# Patient Record
Sex: MALE | Race: WHITE | NOT HISPANIC OR LATINO | Employment: FULL TIME | URBAN - METROPOLITAN AREA
[De-identification: names, ages, dates, MRNs, and addresses within clinical notes are randomized per-mention and may not be internally consistent; named-entity substitution may affect disease eponyms.]

---

## 2020-11-19 ENCOUNTER — OFFICE VISIT (OUTPATIENT)
Dept: FAMILY MEDICINE CLINIC | Facility: CLINIC | Age: 30
End: 2020-11-19

## 2020-11-19 VITALS
HEIGHT: 73 IN | OXYGEN SATURATION: 98 % | TEMPERATURE: 97.3 F | SYSTOLIC BLOOD PRESSURE: 130 MMHG | BODY MASS INDEX: 22.32 KG/M2 | HEART RATE: 86 BPM | WEIGHT: 168.4 LBS | DIASTOLIC BLOOD PRESSURE: 70 MMHG

## 2020-11-19 DIAGNOSIS — B00.9 HERPES INFECTION: Primary | ICD-10-CM

## 2020-11-19 PROCEDURE — 90471 IMMUNIZATION ADMIN: CPT | Performed by: INTERNAL MEDICINE

## 2020-11-19 PROCEDURE — 99214 OFFICE O/P EST MOD 30 MIN: CPT | Performed by: INTERNAL MEDICINE

## 2020-11-19 PROCEDURE — 90686 IIV4 VACC NO PRSV 0.5 ML IM: CPT | Performed by: INTERNAL MEDICINE

## 2020-11-20 LAB
HSV1 IGG SER IA-ACNC: 1.75 INDEX (ref 0–0.9)
HSV1+2 IGM SER IA-ACNC: <0.91 RATIO (ref 0–0.9)
HSV2 IGG SER IA-ACNC: <0.91 INDEX (ref 0–0.9)

## 2020-12-11 ENCOUNTER — TELEPHONE (OUTPATIENT)
Dept: FAMILY MEDICINE CLINIC | Facility: CLINIC | Age: 30
End: 2020-12-11

## 2020-12-12 NOTE — TELEPHONE ENCOUNTER
I discussed wakeup I discussed the patient's lab tests for herpes and we'll try to repeat it in February or March.

## 2021-06-03 ENCOUNTER — OFFICE VISIT (OUTPATIENT)
Dept: FAMILY MEDICINE CLINIC | Facility: CLINIC | Age: 31
End: 2021-06-03

## 2021-06-03 VITALS
BODY MASS INDEX: 21.71 KG/M2 | DIASTOLIC BLOOD PRESSURE: 64 MMHG | HEIGHT: 73 IN | HEART RATE: 87 BPM | OXYGEN SATURATION: 97 % | WEIGHT: 163.8 LBS | TEMPERATURE: 97.5 F | SYSTOLIC BLOOD PRESSURE: 118 MMHG

## 2021-06-03 DIAGNOSIS — E78.1 PURE HYPERTRIGLYCERIDEMIA: ICD-10-CM

## 2021-06-03 DIAGNOSIS — B00.9 HERPES INFECTION: ICD-10-CM

## 2021-06-03 DIAGNOSIS — Z00.00 PE (PHYSICAL EXAM), ANNUAL: Primary | ICD-10-CM

## 2021-06-03 PROCEDURE — 99395 PREV VISIT EST AGE 18-39: CPT | Performed by: INTERNAL MEDICINE

## 2021-06-03 NOTE — PROGRESS NOTES
06/03/2021    CC: Annual Exam  .        HPI  This pleasant 30-year-old presents at this time for physical examination patient relates he is feeling fine has had no problems in the interim of visits.  He is working 40 hours a week.       Edis Bolanos is a 30 y.o. male.      The following portions of the patient's history were reviewed and updated as appropriate: allergies, current medications, past family history, past medical history, past social history, past surgical history and problem list.    Problem List  There is no problem list on file for this patient.      Past Medical History  History reviewed. No pertinent past medical history.    Surgical History  Past Surgical History:   Procedure Laterality Date   • PLEURAL SCARIFICATION         Family History  History reviewed. No pertinent family history.    Social History  Social History    Tobacco Use      Smoking status: Former Smoker      Smokeless tobacco: Never Used       Is the Patient a current tobacco user? No    Allergies  No Known Allergies    Current Medications  No current outpatient medications on file.     Review of System  Review of Systems   Constitutional: Negative.    HENT: Negative.    Eyes: Negative.    Respiratory: Negative.    Cardiovascular: Negative.    Gastrointestinal: Negative.    Musculoskeletal: Negative.    Skin: Negative.    Psychiatric/Behavioral: Negative.      I have reviewed and confirmed the accuracy of the ROS as documented by the MA/LPN/RN Elvis Solo MD    Vitals:    06/03/21 1000   BP: 118/64   Pulse: 87   Temp: 97.5 °F (36.4 °C)   SpO2: 97%     Body mass index is 21.91 kg/m².    Objective     Physical Exam  Physical Exam  Vitals and nursing note reviewed.   Constitutional:       Appearance: He is well-developed.   HENT:      Head: Normocephalic and atraumatic.   Eyes:      Conjunctiva/sclera: Conjunctivae normal.   Cardiovascular:      Rate and Rhythm: Normal rate and regular rhythm.      Heart sounds:  Normal heart sounds.   Pulmonary:      Effort: Pulmonary effort is normal.      Breath sounds: Normal breath sounds.   Abdominal:      General: Bowel sounds are normal.      Palpations: Abdomen is soft.   Musculoskeletal:         General: Normal range of motion.      Cervical back: Normal range of motion and neck supple.   Skin:     General: Skin is warm and dry.   Neurological:      Mental Status: He is alert and oriented to person, place, and time.   Psychiatric:         Behavior: Behavior normal.         Assessment/Plan    Regarding anticipatory guidance.  We discussed the importance of regular physical exercise.  He relates that he is not been very regular considering the pandemic but he will endeavor to.    We will see the patient back for follow-up in about 6 months.  He is on no medication at this point.      Diagnoses and all orders for this visit:    1. PE (physical exam), annual (Primary)  -     Comprehensive Metabolic Panel  -     Urinalysis With Culture If Indicated -  -     CBC & Differential  -     Hepatitis C Antibody    2. Pure hypertriglyceridemia  -     Lipid Panel With / Chol / HDL Ratio    3. Herpes infection  -     HSV 1 & 2 - Specific Antibody, IgG             Elvis Solo MD  06/03/2021

## 2021-06-04 LAB
ALBUMIN SERPL-MCNC: 4.6 G/DL (ref 4.1–5.2)
ALBUMIN/GLOB SERPL: 1.7 {RATIO} (ref 1.2–2.2)
ALP SERPL-CCNC: 45 IU/L (ref 48–121)
ALT SERPL-CCNC: 27 IU/L (ref 0–44)
AST SERPL-CCNC: 30 IU/L (ref 0–40)
BASOPHILS # BLD AUTO: 0 X10E3/UL (ref 0–0.2)
BASOPHILS NFR BLD AUTO: 1 %
BILIRUB SERPL-MCNC: 0.5 MG/DL (ref 0–1.2)
BUN SERPL-MCNC: 13 MG/DL (ref 6–20)
BUN/CREAT SERPL: 17 (ref 9–20)
CALCIUM SERPL-MCNC: 9.2 MG/DL (ref 8.7–10.2)
CHLORIDE SERPL-SCNC: 103 MMOL/L (ref 96–106)
CHOLEST SERPL-MCNC: 149 MG/DL (ref 100–199)
CHOLEST/HDLC SERPL: 3.9 RATIO (ref 0–5)
CO2 SERPL-SCNC: 26 MMOL/L (ref 20–29)
CREAT SERPL-MCNC: 0.77 MG/DL (ref 0.76–1.27)
EOSINOPHIL # BLD AUTO: 0.1 X10E3/UL (ref 0–0.4)
EOSINOPHIL NFR BLD AUTO: 1 %
ERYTHROCYTE [DISTWIDTH] IN BLOOD BY AUTOMATED COUNT: 12.1 % (ref 11.6–15.4)
GLOBULIN SER CALC-MCNC: 2.7 G/DL (ref 1.5–4.5)
GLUCOSE SERPL-MCNC: 88 MG/DL (ref 65–99)
HCT VFR BLD AUTO: 43.6 % (ref 37.5–51)
HCV AB S/CO SERPL IA: <0.1 S/CO RATIO (ref 0–0.9)
HDLC SERPL-MCNC: 38 MG/DL
HGB BLD-MCNC: 15.1 G/DL (ref 13–17.7)
HSV1 IGG SER IA-ACNC: 1.46 INDEX (ref 0–0.9)
HSV2 IGG SER IA-ACNC: <0.91 INDEX (ref 0–0.9)
IMM GRANULOCYTES # BLD AUTO: 0 X10E3/UL (ref 0–0.1)
IMM GRANULOCYTES NFR BLD AUTO: 0 %
LDLC SERPL CALC-MCNC: 98 MG/DL (ref 0–99)
LYMPHOCYTES # BLD AUTO: 1.4 X10E3/UL (ref 0.7–3.1)
LYMPHOCYTES NFR BLD AUTO: 19 %
MCH RBC QN AUTO: 30.7 PG (ref 26.6–33)
MCHC RBC AUTO-ENTMCNC: 34.6 G/DL (ref 31.5–35.7)
MCV RBC AUTO: 89 FL (ref 79–97)
MONOCYTES # BLD AUTO: 0.5 X10E3/UL (ref 0.1–0.9)
MONOCYTES NFR BLD AUTO: 7 %
NEUTROPHILS # BLD AUTO: 5.2 X10E3/UL (ref 1.4–7)
NEUTROPHILS NFR BLD AUTO: 72 %
PLATELET # BLD AUTO: 250 X10E3/UL (ref 150–450)
POTASSIUM SERPL-SCNC: 4.2 MMOL/L (ref 3.5–5.2)
PROT SERPL-MCNC: 7.3 G/DL (ref 6–8.5)
RBC # BLD AUTO: 4.92 X10E6/UL (ref 4.14–5.8)
SODIUM SERPL-SCNC: 140 MMOL/L (ref 134–144)
TRIGL SERPL-MCNC: 66 MG/DL (ref 0–149)
VLDLC SERPL CALC-MCNC: 13 MG/DL (ref 5–40)
WBC # BLD AUTO: 7.2 X10E3/UL (ref 3.4–10.8)

## 2021-06-16 ENCOUNTER — OFFICE VISIT (OUTPATIENT)
Dept: FAMILY MEDICINE CLINIC | Facility: CLINIC | Age: 31
End: 2021-06-16

## 2021-06-16 VITALS
HEIGHT: 73 IN | RESPIRATION RATE: 16 BRPM | BODY MASS INDEX: 21.6 KG/M2 | WEIGHT: 163 LBS | SYSTOLIC BLOOD PRESSURE: 110 MMHG | DIASTOLIC BLOOD PRESSURE: 70 MMHG

## 2021-06-16 DIAGNOSIS — B00.9 HERPES SIMPLEX INFECTION: Primary | ICD-10-CM

## 2021-06-16 PROCEDURE — 99213 OFFICE O/P EST LOW 20 MIN: CPT | Performed by: INTERNAL MEDICINE

## 2021-06-20 NOTE — PROGRESS NOTES
06/16/2021    CC: Follow-up (on labs)  .        HPI  History of Present Illness     Subjective   Gomez Bolanos is a 30 y.o. male.      The following portions of the patient's history were reviewed and updated as appropriate: allergies, current medications, past family history, past medical history, past social history, past surgical history and problem list.    Problem List  There is no problem list on file for this patient.      Past Medical History  History reviewed. No pertinent past medical history.    Surgical History  Past Surgical History:   Procedure Laterality Date   • PLEURAL SCARIFICATION         Family History  History reviewed. No pertinent family history.    Social History  Social History    Tobacco Use      Smoking status: Former Smoker      Smokeless tobacco: Never Used       Is the Patient a current tobacco user? No    Allergies  No Known Allergies    Current Medications  No current outpatient medications on file.     Review of System  Review of Systems   Constitutional: Negative.    HENT: Negative.    Eyes: Negative.    Respiratory: Negative.    Cardiovascular: Negative.    Gastrointestinal: Negative.    Endocrine: Negative.    Genitourinary: Negative.      I have reviewed and confirmed the accuracy of the ROS as documented by the MA/LPN/RN Elvis Solo MD    Vitals:    06/16/21 1103   BP: 110/70   Resp: 16     Body mass index is 21.8 kg/m².    Objective     Physical Exam  Physical Exam  Cardiovascular:      Rate and Rhythm: Normal rate and regular rhythm.      Pulses: Normal pulses.      Heart sounds: Normal heart sounds.   Pulmonary:      Effort: Pulmonary effort is normal.      Breath sounds: Normal breath sounds.         Assessment/Plan          This pleasant 30-year-old returns at this time for discussion about laboratory test results.  He is very concerned earlier in the year regarding HSV testing.  Although he had not had a kolby genital lesion is very concerned about possibility of  infection.    His testing on 6/3 showed HSV 1 IgG of 1.461 while 7 months ago was 1.75.    His HSV 2 IgG was less than 0.91 on 66/3/21 and 7 months ago indicating no antibodies against HSV 2 we discussed these details with him and specifics indicating there is no indication of HSV 2 infection.    We answered his questions.  We'll see the patient in follow-up in 6-12 months.      Diagnoses and all orders for this visit:    1. Herpes simplex infection (Primary)             Elvis Solo MD  06/16/2021

## 2023-04-12 ENCOUNTER — OFFICE VISIT (OUTPATIENT)
Dept: FAMILY MEDICINE CLINIC | Facility: CLINIC | Age: 33
End: 2023-04-12
Payer: COMMERCIAL

## 2023-04-12 VITALS
HEART RATE: 90 BPM | SYSTOLIC BLOOD PRESSURE: 100 MMHG | BODY MASS INDEX: 21.84 KG/M2 | WEIGHT: 164.8 LBS | OXYGEN SATURATION: 100 % | HEIGHT: 73 IN | DIASTOLIC BLOOD PRESSURE: 50 MMHG

## 2023-04-12 DIAGNOSIS — L02.811 CELLULITIS AND ABSCESS OF HEAD: ICD-10-CM

## 2023-04-12 DIAGNOSIS — F41.9 ANXIETY DISORDER, UNSPECIFIED TYPE: Primary | ICD-10-CM

## 2023-04-12 DIAGNOSIS — L03.811 CELLULITIS AND ABSCESS OF HEAD: ICD-10-CM

## 2023-04-12 DIAGNOSIS — R59.9 LYMPH NODE ENLARGEMENT: ICD-10-CM

## 2023-04-12 PROCEDURE — 99214 OFFICE O/P EST MOD 30 MIN: CPT | Performed by: INTERNAL MEDICINE

## 2023-04-12 RX ORDER — ESCITALOPRAM OXALATE 10 MG/1
10 TABLET ORAL DAILY
Qty: 30 TABLET | Refills: 2 | Status: SHIPPED | OUTPATIENT
Start: 2023-04-12

## 2023-04-12 RX ORDER — DIPHENOXYLATE HYDROCHLORIDE AND ATROPINE SULFATE 2.5; .025 MG/1; MG/1
TABLET ORAL
COMMUNITY

## 2023-04-12 RX ORDER — CEPHALEXIN 500 MG/1
500 CAPSULE ORAL 2 TIMES DAILY
Qty: 20 CAPSULE | Refills: 0 | Status: SHIPPED | OUTPATIENT
Start: 2023-04-12

## 2023-04-12 NOTE — PROGRESS NOTES
Answers for HPI/ROS submitted by the patient on 4/11/2023  What is the primary reason for your visit?: Other  Please describe your symptoms.: Sleep disorder and lump on right side of neck  Have you had these symptoms before?: Yes  How long have you been having these symptoms?: Greater than 2 weeks  Please list any medications you are currently taking for this condition.: None  Please describe any probable cause for these symptoms. : Trauma induced sleep disorder. Unsure of lump on neck - possible from past absess infection    04/12/2023    Assessment & Plan      This 32-year-old presents at this time for evaluation of a mass on the right side of his neck is been present for about a month or so.  He relates it is not painful has not changed in size but he is becoming increasingly concerned about it.  He denies any chills or fever.  He also has a inflammation about 3 cm superiorly toward the pinna on the right ear of a area of cellulitis about 3 cm in diameter.  He relates that the area of cellulitis is only been present for the past several days.    He also complains of problems sleeping.  Note is made that he was previously diagnosed with general anxiety disorder poor.  He resisted medication at the time but that he is more amenable to it now.  He relates that he awakens 10 to 15-20 times during the night and has difficulty getting back to sleep.  He does not snore he relates.  He relates some of the sleep back to some occurrences of events as a child.  Diagnoses and all orders for this visit:    1. Anxiety disorder, unspecified type (Primary)    2. Cellulitis and abscess of head    3. Lymph node enlargement  -     Ambulatory Referral to ENT (Otolaryngology)    Other orders  -     cephalexin (Keflex) 500 MG capsule; Take 1 capsule by mouth 2 (Two) Times a Day.  Dispense: 20 capsule; Refill: 0  -     escitalopram (Lexapro) 10 MG tablet; Take 1 tablet by mouth Daily.  Dispense: 30 tablet; Refill: 2             CC:  Mass (On right side of neck.  Noticed 1 month ago.  No pain.  Has not changed in size.) and Insomnia (Has trouble staying to sleep.  Ongoing since childhood.  Will wake up 10-20 times every night.----no other issues)  .        HPI  History of Present Illness     Subjective   Gomez Bolanos is a 32 y.o. male.      The following portions of the patient's history were reviewed and updated as appropriate: allergies, current medications, past family history, past medical history, past social history, past surgical history and problem list.    Problem List  There is no problem list on file for this patient.      Past Medical History  History reviewed. No pertinent past medical history.    Surgical History  Past Surgical History:   Procedure Laterality Date   • PLEURAL SCARIFICATION         Family History  History reviewed. No pertinent family history.    Social History  Social History    Tobacco Use      Smoking status: Former        Packs/day: 0.00        Years: 0.50        Pack years: 0        Types: Cigarettes        Start date: 2010        Quit date: 2010        Years since quittin.2      Smokeless tobacco: Never      Tobacco comments: Very little consumed in        Is the Patient a current tobacco user? No    Allergies  No Known Allergies    Current Medications    Current Outpatient Medications:   •  multivitamin (THERAGRAN) tablet tablet, Take  by mouth., Disp: , Rfl:   •  cephalexin (Keflex) 500 MG capsule, Take 1 capsule by mouth 2 (Two) Times a Day., Disp: 20 capsule, Rfl: 0  •  escitalopram (Lexapro) 10 MG tablet, Take 1 tablet by mouth Daily., Disp: 30 tablet, Rfl: 2     Review of System  Review of Systems   Constitutional: Negative for chills and fever.   Respiratory: Negative for cough and shortness of breath.    Cardiovascular: Negative for chest pain and palpitations.   Gastrointestinal: Negative for constipation, diarrhea, nausea and vomiting.   Neurological: Negative for dizziness and  headache.     I have reviewed and confirmed the accuracy of the ROS as documented by the MA/LPN/RN Elvis Solo MD    Vitals:    04/12/23 1055   BP: 100/50   Pulse: 90   SpO2: 100%     Body mass index is 22.04 kg/m².    Objective     Physical Exam  Physical Exam  Constitutional:       General: He is not in acute distress.     Appearance: Normal appearance.   HENT:      Head: Normocephalic and atraumatic.   Cardiovascular:      Rate and Rhythm: Normal rate and regular rhythm.   Pulmonary:      Effort: Pulmonary effort is normal. No respiratory distress.      Breath sounds: Normal breath sounds. No wheezing, rhonchi or rales.   Skin:     General: Skin is warm.      Findings: Lesion present.      Comments: 2 cm rubbery lesion consistent with lymph node in the right an cervical lymph chain.   Neurological:      General: No focal deficit present.      Mental Status: He is alert and oriented to person, place, and time.   Psychiatric:         Mood and Affect: Mood normal.         Behavior: Behavior normal.         Thought Content: Thought content normal.         Judgment: Judgment normal.             Elvis Solo MD  04/12/2023

## 2023-08-15 ENCOUNTER — TELEPHONE (OUTPATIENT)
Dept: FAMILY MEDICINE CLINIC | Facility: CLINIC | Age: 33
End: 2023-08-15

## 2023-08-15 NOTE — TELEPHONE ENCOUNTER
Caller: Gomez Bolanos    Relationship to patient: Self    Best call back number:     Patient is needing: PATIENT IS REQUESTING A CALLBACK FROM DR. COELHO AS HE HAS A QUESTION ABOUT THE COLOR OF HIS FECAL MATTER.

## 2024-07-22 ENCOUNTER — OFFICE VISIT (OUTPATIENT)
Dept: FAMILY MEDICINE CLINIC | Facility: CLINIC | Age: 34
End: 2024-07-22
Payer: COMMERCIAL

## 2024-07-22 VITALS
HEIGHT: 73 IN | SYSTOLIC BLOOD PRESSURE: 90 MMHG | BODY MASS INDEX: 22.93 KG/M2 | WEIGHT: 173 LBS | DIASTOLIC BLOOD PRESSURE: 70 MMHG

## 2024-07-22 DIAGNOSIS — E78.5 HYPERLIPIDEMIA, UNSPECIFIED HYPERLIPIDEMIA TYPE: ICD-10-CM

## 2024-07-22 DIAGNOSIS — D50.9 IRON DEFICIENCY ANEMIA, UNSPECIFIED IRON DEFICIENCY ANEMIA TYPE: ICD-10-CM

## 2024-07-22 DIAGNOSIS — I10 PRIMARY HYPERTENSION: Primary | ICD-10-CM

## 2024-07-22 PROCEDURE — 99395 PREV VISIT EST AGE 18-39: CPT | Performed by: INTERNAL MEDICINE

## 2024-08-05 NOTE — PROGRESS NOTES
2024    Assessment & Plan   This pleasant 34-year-old presents at this time for physical examination.  He relates that he has had low back pain off and on for the past 2 to 3 weeks.  It does not inhibit him from his usual activities or work.      Regarding anticipatory guidance.  We discussed the importance of keeping his LDL cholesterol less than 100.  His last cholesterol reviewed back on 3/19 showed an LDL of 98.    We gave him a low-cholesterol diet sheet for implementation at our direction.    Diagnoses and all orders for this visit:    1. Primary hypertension (Primary)  -     Comprehensive Metabolic Panel    2. Hyperlipidemia, unspecified hyperlipidemia type  -     Lipid Panel With / Chol / HDL Ratio    3. Iron deficiency anemia, unspecified iron deficiency anemia type  -     CBC & Differential      BMI is within normal parameters. No other follow-up for BMI required.           CC: Annual Exam  .        HPI  History of Present Illness     Subjective   Gomez Bolanos is a 34 y.o. male.      The following portions of the patient's history were reviewed and updated as appropriate: allergies, current medications, past family history, past medical history, past social history, past surgical history, and problem list.    Problem List  There is no problem list on file for this patient.      Past Medical History  History reviewed. No pertinent past medical history.    Surgical History  Past Surgical History:   Procedure Laterality Date    PLEURAL SCARIFICATION         Family History  History reviewed. No pertinent family history.    Social History  Social History    Tobacco Use      Smoking status: Former        Packs/day: 0.00        Years: 0.3 packs/day for 0.1 years        Types: Cigarettes        Start date: 2010        Quit date: 2010        Years since quittin.5      Smokeless tobacco: Never      Tobacco comments: Very little consumed in        Is the Patient a current tobacco user?  No    Allergies  No Known Allergies    Current Medications    Current Outpatient Medications:     multivitamin (THERAGRAN) tablet tablet, Take  by mouth., Disp: , Rfl:     escitalopram (Lexapro) 10 MG tablet, Take 1 tablet by mouth Daily., Disp: 30 tablet, Rfl: 2     Review of System  Review of Systems   Constitutional: Negative.    HENT: Negative.     Eyes: Negative.    Respiratory: Negative.     Cardiovascular: Negative.    Gastrointestinal: Negative.    Endocrine: Negative.    Genitourinary: Negative.    Musculoskeletal: Negative.    Skin: Negative.    Allergic/Immunologic: Negative.    Neurological: Negative.    Hematological: Negative.    Psychiatric/Behavioral: Negative.       I have reviewed and confirmed the accuracy of the ROS as documented by the MA/LPN/RN Elvis Solo MD    Vitals:    07/22/24 1433   BP: 90/70     Body mass index is 23.14 kg/m².    Objective     Physical Exam  Physical Exam  Vitals and nursing note reviewed.   Constitutional:       Appearance: He is well-developed.   HENT:      Head: Normocephalic and atraumatic.   Eyes:      Conjunctiva/sclera: Conjunctivae normal.   Cardiovascular:      Rate and Rhythm: Normal rate and regular rhythm.      Heart sounds: Normal heart sounds.   Pulmonary:      Effort: Pulmonary effort is normal.      Breath sounds: Normal breath sounds.   Abdominal:      General: Bowel sounds are normal.      Palpations: Abdomen is soft.   Musculoskeletal:         General: Normal range of motion.      Cervical back: Normal range of motion and neck supple.   Skin:     General: Skin is warm and dry.   Neurological:      Mental Status: He is alert and oriented to person, place, and time.   Psychiatric:         Behavior: Behavior normal.             Elvis Solo MD  07/22/2024

## 2024-08-07 LAB
ALBUMIN SERPL-MCNC: 4.8 G/DL (ref 3.5–5.2)
ALBUMIN/GLOB SERPL: 1.8 G/DL
ALP SERPL-CCNC: 47 U/L (ref 39–117)
ALT SERPL-CCNC: 29 U/L (ref 1–41)
AST SERPL-CCNC: 30 U/L (ref 1–40)
BASOPHILS # BLD AUTO: 0.04 10*3/MM3 (ref 0–0.2)
BASOPHILS NFR BLD AUTO: 0.7 % (ref 0–1.5)
BILIRUB SERPL-MCNC: 1.2 MG/DL (ref 0–1.2)
BUN SERPL-MCNC: 11 MG/DL (ref 6–20)
BUN/CREAT SERPL: 12.5 (ref 7–25)
CALCIUM SERPL-MCNC: 9.5 MG/DL (ref 8.6–10.5)
CHLORIDE SERPL-SCNC: 101 MMOL/L (ref 98–107)
CHOLEST SERPL-MCNC: 163 MG/DL (ref 0–200)
CHOLEST/HDLC SERPL: 4.08 {RATIO}
CO2 SERPL-SCNC: 26.2 MMOL/L (ref 22–29)
CREAT SERPL-MCNC: 0.88 MG/DL (ref 0.76–1.27)
EGFRCR SERPLBLD CKD-EPI 2021: 117.2 ML/MIN/1.73
EOSINOPHIL # BLD AUTO: 0.07 10*3/MM3 (ref 0–0.4)
EOSINOPHIL NFR BLD AUTO: 1.2 % (ref 0.3–6.2)
ERYTHROCYTE [DISTWIDTH] IN BLOOD BY AUTOMATED COUNT: 11.8 % (ref 12.3–15.4)
GLOBULIN SER CALC-MCNC: 2.6 GM/DL
GLUCOSE SERPL-MCNC: 100 MG/DL (ref 65–99)
HCT VFR BLD AUTO: 47.2 % (ref 37.5–51)
HDLC SERPL-MCNC: 40 MG/DL (ref 40–60)
HGB BLD-MCNC: 15.8 G/DL (ref 13–17.7)
IMM GRANULOCYTES # BLD AUTO: 0.02 10*3/MM3 (ref 0–0.05)
IMM GRANULOCYTES NFR BLD AUTO: 0.3 % (ref 0–0.5)
LDLC SERPL CALC-MCNC: 113 MG/DL (ref 0–100)
LYMPHOCYTES # BLD AUTO: 1.43 10*3/MM3 (ref 0.7–3.1)
LYMPHOCYTES NFR BLD AUTO: 24.2 % (ref 19.6–45.3)
MCH RBC QN AUTO: 30.4 PG (ref 26.6–33)
MCHC RBC AUTO-ENTMCNC: 33.5 G/DL (ref 31.5–35.7)
MCV RBC AUTO: 90.8 FL (ref 79–97)
MONOCYTES # BLD AUTO: 0.43 10*3/MM3 (ref 0.1–0.9)
MONOCYTES NFR BLD AUTO: 7.3 % (ref 5–12)
NEUTROPHILS # BLD AUTO: 3.91 10*3/MM3 (ref 1.7–7)
NEUTROPHILS NFR BLD AUTO: 66.3 % (ref 42.7–76)
NRBC BLD AUTO-RTO: 0 /100 WBC (ref 0–0.2)
PLATELET # BLD AUTO: 294 10*3/MM3 (ref 140–450)
POTASSIUM SERPL-SCNC: 4.4 MMOL/L (ref 3.5–5.2)
PROT SERPL-MCNC: 7.4 G/DL (ref 6–8.5)
RBC # BLD AUTO: 5.2 10*6/MM3 (ref 4.14–5.8)
SODIUM SERPL-SCNC: 137 MMOL/L (ref 136–145)
TRIGL SERPL-MCNC: 51 MG/DL (ref 0–150)
VLDLC SERPL CALC-MCNC: 10 MG/DL (ref 5–40)
WBC # BLD AUTO: 5.9 10*3/MM3 (ref 3.4–10.8)

## 2025-03-04 ENCOUNTER — OFFICE VISIT (OUTPATIENT)
Dept: FAMILY MEDICINE CLINIC | Facility: CLINIC | Age: 35
End: 2025-03-04
Payer: COMMERCIAL

## 2025-03-04 VITALS
OXYGEN SATURATION: 99 % | SYSTOLIC BLOOD PRESSURE: 110 MMHG | DIASTOLIC BLOOD PRESSURE: 68 MMHG | BODY MASS INDEX: 23.06 KG/M2 | HEIGHT: 73 IN | HEART RATE: 60 BPM | WEIGHT: 174 LBS

## 2025-03-04 DIAGNOSIS — E78.5 HYPERLIPIDEMIA, UNSPECIFIED HYPERLIPIDEMIA TYPE: Primary | ICD-10-CM

## 2025-03-04 DIAGNOSIS — E55.9 VITAMIN D DEFICIENCY: ICD-10-CM

## 2025-03-04 DIAGNOSIS — E53.8 B12 DEFICIENCY: ICD-10-CM

## 2025-03-04 PROCEDURE — 99213 OFFICE O/P EST LOW 20 MIN: CPT | Performed by: INTERNAL MEDICINE

## 2025-03-04 NOTE — PROGRESS NOTES
03/04/2025    Summarization of Visit     This 34-year-old patient presents today for follow-up of anxiety.  He was last seen about 6 months ago.  His his lipid profile was elevated at the time and we also put him on a low-cholesterol diet.  He relates that he has been adherent to the diet but he is not fasting today and will return for blood test.  We had a discussion also regarding vitamins in particular vitamin D and vitamin B12.  We discussed with them how suggestions of B12 deficiency can be seen in his blood work which has been normal to date.    He relates that the escitalopram that he was on before he felt may have caused him to be less productive and his work.  We discussed with him that several months ago he was concerned about not being focused and having anxiety and thus with medication was prescribed for.  In any case he feels that he is doing fine at this point and we will observe for now but will not restart this medication.    Assessment & Plan  1. Health maintenance.  His blood pressure readings are within the normal range, and there has been no significant change in his weight. He is due for influenza vaccine and COVID-19 booster. A comprehensive discussion regarding the necessity of vitamins was conducted. A fasting blood test will be ordered to assess his cholesterol levels, vitamin D, and B12 status. He has been advised to abstain from food intake after midnight prior to the test. The results will be communicated to him via phone call a few days post-testing. He has been given the option to continue with Theragran if he so desires. Should the blood test results indicate a deficiency in vitamin D or B12, appropriate supplementation will be initiated.    2. Anxiety.  He has discontinued the use of escitalopram. He will maintain his current regimen without the inclusion of escitalopram.    Follow-up  The patient is scheduled for a follow-up visit in 4 months for a physical  examination.    Results      BMI is within normal parameters. No other follow-up for BMI required.           CC: Anxiety (F/U--no other issues)  .        HPI  Anxiety   Patient reports no chest pain, dizziness, nausea, palpitations or shortness of breath.      History of Present Illness  The patient presents for a 6-month follow-up visit.    He has been on a regimen of [employer] multivitamins and is seeking advice on whether to continue with this brand or switch to another. He does not engage in any physical exercise outside of his work activities.    He was previously prescribed escitalopram for a duration of 2 to 3 months but discontinued its use due to perceived lack of efficacy. He reports a decrease in his work performance, which he attributes to the medication. Since discontinuing escitalopram, he has been managing well and has implemented an earlier bedtime routine, which he finds beneficial. He occasionally uses ZzzQuil, taking 30 mL of the liquid form 3 to 4 times per week before sleep, and reports that this aids in maintaining his sleep.    MEDICATIONS  Current: [employer] multivitamins, ZzzQuil  Discontinued: escitalopram    IMMUNIZATIONS  He is due for influenza vaccine and COVID-19 booster.    Subjective   Gomez Bolanos is a 34 y.o. male.      The following portions of the patient's history were reviewed and updated as appropriate: allergies, current medications, past family history, past medical history, past social history, past surgical history, and problem list.    Problem List  There is no problem list on file for this patient.      Past Medical History  History reviewed. No pertinent past medical history.    Surgical History  Past Surgical History:   Procedure Laterality Date    PLEURAL SCARIFICATION         Family History  History reviewed. No pertinent family history.    Social History  Social History    Tobacco Use      Smoking status: Former        Packs/day: 0.00        Years: 0.3 packs/day for  0.1 years        Types: Cigarettes        Start date: 1/1/2010        Quit date: 1/31/2010        Years since quitting: 15.0      Smokeless tobacco: Never      Tobacco comments: Very little consumed in 2010       Is the Patient a current tobacco user? No    Allergies  No Known Allergies    Current Medications    Current Outpatient Medications:     multivitamin (THERAGRAN) tablet tablet, Take  by mouth., Disp: , Rfl:     escitalopram (Lexapro) 10 MG tablet, Take 1 tablet by mouth Daily. (Patient not taking: Reported on 3/4/2025), Disp: 30 tablet, Rfl: 2     Review of System  Review of Systems   Constitutional:  Negative for chills and fever.   Respiratory:  Negative for cough and shortness of breath.    Cardiovascular:  Negative for chest pain and palpitations.   Gastrointestinal:  Negative for constipation, diarrhea, nausea and vomiting.   Neurological:  Negative for dizziness and headache.     I have reviewed and confirmed the accuracy of the ROS as documented by the MA/LPN/RN Elvis Solo MD    Vitals:    03/04/25 0724   BP: 110/68   Pulse: 60   SpO2: 99%     Body mass index is 23.27 kg/m².    Objective     Physical Exam  Physical Exam  Constitutional:       General: He is not in acute distress.     Appearance: Normal appearance.   HENT:      Head: Normocephalic and atraumatic.   Cardiovascular:      Rate and Rhythm: Normal rate and regular rhythm.   Pulmonary:      Effort: Pulmonary effort is normal. No respiratory distress.      Breath sounds: Normal breath sounds. No wheezing, rhonchi or rales.   Neurological:      General: No focal deficit present.      Mental Status: He is alert and oriented to person, place, and time.   Psychiatric:         Mood and Affect: Mood normal.         Behavior: Behavior normal.         Thought Content: Thought content normal.         Judgment: Judgment normal.           Patient or patient representative verbalized consent for the use of Ambient Listening during the visit with   Elvis Solo MD for chart documentation. 3/4/2025  08:23 EST    Elvis Solo MD  03/04/2025

## 2025-03-13 ENCOUNTER — RESULTS FOLLOW-UP (OUTPATIENT)
Dept: FAMILY MEDICINE CLINIC | Facility: CLINIC | Age: 35
End: 2025-03-13
Payer: COMMERCIAL

## 2025-03-13 NOTE — PROGRESS NOTES
Call patient to notify of results    Your recent blood test shows that your vitamin D level is slightly low.  Lets add vitamin D 500 international units daily to your vitamin regimen.  This product can be purchased over-the-counter.  Will recheck it in the next several months.    Your lipid level shows that your LDL/bad cholesterol only minimally improved.  Lets more strictly adhere to a low-cholesterol diet sheet we gave you and we will reevaluate the this in the future as well.

## 2025-03-13 NOTE — LETTER
Gomez Bolanos  [address] [id number]    March 13, 2025     Dear Mr. Bolanos:    Below are the results from your recent visit:    Resulted Orders   Lipid panel   Result Value Ref Range    Total Cholesterol 155 0 - 200 mg/dL      Comment:      Cholesterol Reference Ranges  (U.S. Department of Health and Human Services ATP III  Classifications)  Desirable          <200 mg/dL  Borderline High    200-239 mg/dL  High Risk          >240 mg/dL  Triglyceride Reference Ranges  (U.S. Department of Health and Human Services ATP III  Classifications)  Normal           <150 mg/dL  Borderline High  150-199 mg/dL  High             200-499 mg/dL  Very High        >500 mg/dL  HDL Reference Ranges  (U.S. Department of Health and Human Services ATP III  Classifications)  Low     <40 mg/dl (major risk factor for CHD)  High    >60 mg/dl ('negative' risk factor for CHD)  LDL Reference Ranges  (U.S. Department of Health and Human Services ATP III  Classifications)  Optimal          <100 mg/dL  Near Optimal     100-129 mg/dL  Borderline High  130-159 mg/dL  High             160-189 mg/dL  Very High        >189 mg/dL  LDL is calculated using the NIH LDL-C calculation.      Triglycerides 65 0 - 150 mg/dL    HDL Cholesterol 36 (L) 40 - 60 mg/dL    VLDL Cholesterol Grady 13 5 - 40 mg/dL    LDL Chol Calc ([facility]) 106 (H) 0 - 100 mg/dL   Vitamin D 25 hydroxy   Result Value Ref Range    25 Hydroxy, Vitamin D 28.1 (L) 30.0 - 100.0 ng/ml      Comment:      Reference Range for Total Vitamin D 25(OH)  Deficiency <20.0 ng/mL  Insufficiency 21-29 ng/mL  Sufficiency  ng/mL  Toxicity >100 ng/ml     Vitamin B12   Result Value Ref Range    Vitamin B-12 616 211 - 946 pg/mL      Comment:      Results may be falsely increased if patient taking Biotin.       Your recent blood test shows that your vitamin D level is slightly low.  Lets add vitamin D 500 international units daily to your vitamin regimen.  This product can be purchased  over-the-counter.  Will recheck it in the next several months.     Your lipid level shows that your LDL/bad cholesterol only minimally improved.  Lets more strictly adhere to a low-cholesterol diet sheet we gave you and we will reevaluate the this in the future as well.    If you have any questions or concerns, please don't hesitate to call.         Sincerely,        Elvis Solo MD

## 2025-07-14 ENCOUNTER — OFFICE VISIT (OUTPATIENT)
Dept: FAMILY MEDICINE CLINIC | Facility: CLINIC | Age: 35
End: 2025-07-14
Payer: COMMERCIAL

## 2025-07-14 VITALS
SYSTOLIC BLOOD PRESSURE: 104 MMHG | BODY MASS INDEX: 23.08 KG/M2 | HEART RATE: 90 BPM | HEIGHT: 72 IN | WEIGHT: 170.4 LBS | OXYGEN SATURATION: 98 % | DIASTOLIC BLOOD PRESSURE: 60 MMHG | TEMPERATURE: 98.8 F

## 2025-07-14 DIAGNOSIS — R52 BODY ACHES: ICD-10-CM

## 2025-07-14 DIAGNOSIS — J02.9 SORE THROAT: Primary | ICD-10-CM

## 2025-07-14 DIAGNOSIS — R53.83 FATIGUE, UNSPECIFIED TYPE: ICD-10-CM

## 2025-07-14 DIAGNOSIS — J06.9 VIRAL URI WITH COUGH: ICD-10-CM

## 2025-07-14 LAB
EXPIRATION DATE: NORMAL
EXPIRATION DATE: NORMAL
FLUAV AG UPPER RESP QL IA.RAPID: NOT DETECTED
FLUBV AG UPPER RESP QL IA.RAPID: NOT DETECTED
INTERNAL CONTROL: NORMAL
INTERNAL CONTROL: NORMAL
Lab: NORMAL
Lab: NORMAL
S PYO AG THROAT QL: NEGATIVE
SARS-COV-2 AG UPPER RESP QL IA.RAPID: NOT DETECTED

## 2025-07-14 PROCEDURE — 87880 STREP A ASSAY W/OPTIC: CPT | Performed by: INTERNAL MEDICINE

## 2025-07-14 PROCEDURE — 87428 SARSCOV & INF VIR A&B AG IA: CPT | Performed by: INTERNAL MEDICINE

## 2025-07-14 PROCEDURE — 99213 OFFICE O/P EST LOW 20 MIN: CPT | Performed by: INTERNAL MEDICINE

## 2025-07-14 NOTE — PROGRESS NOTES
Subjective chief complaint is generalized body aches and sore throat  Gomez Bolanos is a 34 y.o. male.     Sore Throat  Associated symptoms: cough    Fatigue  Symptoms: cough, fatigue, myalgias and sore throat   Gomez is here today for generalized bodyaches and sore throat.  His symptoms began yesterday.  Does report that he worked out in the heat on Friday and felt somewhat fatigued after that.  He is not necessarily having fever but did have chills and felt like he needed to get under blankets.  His throat is mildly sore.  He is not having any ear pain.  He may have a little bit of sinus congestion and cough.    The following portions of the patient's history were reviewed and updated as appropriate: He  has no past medical history on file.  He does not have a problem list on file.  Current Outpatient Medications   Medication Sig Dispense Refill    multivitamin (THERAGRAN) tablet tablet Take  by mouth.       No current facility-administered medications for this visit.     He has no known allergies..    Review of Systems   Constitutional:  Positive for fatigue.   HENT:  Positive for sore throat.    Respiratory:  Positive for cough.    Musculoskeletal:  Positive for arthralgias and myalgias.     Objective   Physical Exam  Vitals and nursing note reviewed.   HENT:      Ears:      Comments: There is mild cerumen in the external auditory canals but the visualized portions of the tympanic membrane are normal     Nose: Congestion and rhinorrhea present.      Mouth/Throat:      Mouth: Mucous membranes are moist.      Pharynx: Oropharynx is clear. Posterior oropharyngeal erythema present. No oropharyngeal exudate.   Cardiovascular:      Rate and Rhythm: Normal rate.   Pulmonary:      Effort: Pulmonary effort is normal.      Breath sounds: No wheezing, rhonchi or rales.   Lymphadenopathy:      Cervical: No cervical adenopathy.     Assessment & Plan   Diagnoses and all orders for this visit:    1. Viral URI with cough  (Primary)    Gomez is here today complaining of generalized aches sore throat and fatigue.  His COVID and influenza test were negative.  His rapid strep screen was also negative.  His exam is fairly unremarkable.  I did advise him to recheck for COVID with a home test in 2 days.  In the interim he can use Tylenol for aches and fever.  He can use ibuprofen for sore throat.  He can use Mucinex DM for cough and congestion and try to keep himself hydrated.

## 2025-07-15 ENCOUNTER — TELEPHONE (OUTPATIENT)
Dept: FAMILY MEDICINE CLINIC | Facility: CLINIC | Age: 35
End: 2025-07-15